# Patient Record
Sex: FEMALE | URBAN - METROPOLITAN AREA
[De-identification: names, ages, dates, MRNs, and addresses within clinical notes are randomized per-mention and may not be internally consistent; named-entity substitution may affect disease eponyms.]

---

## 2019-01-17 ENCOUNTER — OFFICE VISIT (OUTPATIENT)
Dept: INTERNAL MEDICINE CLINIC | Facility: CLINIC | Age: 24
End: 2019-01-17
Payer: COMMERCIAL

## 2019-01-17 VITALS
WEIGHT: 293 LBS | HEIGHT: 72 IN | TEMPERATURE: 97.8 F | HEART RATE: 98 BPM | DIASTOLIC BLOOD PRESSURE: 80 MMHG | BODY MASS INDEX: 39.68 KG/M2 | SYSTOLIC BLOOD PRESSURE: 110 MMHG | OXYGEN SATURATION: 98 %

## 2019-01-17 DIAGNOSIS — J06.9 VIRAL UPPER RESPIRATORY TRACT INFECTION: Primary | ICD-10-CM

## 2019-01-17 DIAGNOSIS — H61.23 BILATERAL IMPACTED CERUMEN: ICD-10-CM

## 2019-01-17 PROCEDURE — 99202 OFFICE O/P NEW SF 15 MIN: CPT | Performed by: NURSE PRACTITIONER

## 2019-01-17 PROCEDURE — 1036F TOBACCO NON-USER: CPT | Performed by: NURSE PRACTITIONER

## 2019-01-17 PROCEDURE — 3008F BODY MASS INDEX DOCD: CPT | Performed by: NURSE PRACTITIONER

## 2019-01-17 RX ORDER — BENZONATATE 100 MG/1
100 CAPSULE ORAL 3 TIMES DAILY PRN
Qty: 20 CAPSULE | Refills: 0 | Status: SHIPPED | OUTPATIENT
Start: 2019-01-17

## 2019-01-17 NOTE — PROGRESS NOTES
Assessment/Plan:     Diagnoses and all orders for this visit:    Viral upper respiratory tract infection  -     benzonatate (TESSALON PERLES) 100 mg capsule; Take 1 capsule (100 mg total) by mouth 3 (three) times a day as needed for cough  -     Symptomatic treatment with mucinex, tessalon perles, warm tea with honey, warm salt water gargles  Continue nasocort and zyrtec  Advised to follow up if not improving in 5-7 days or for fever >100 4  Bilateral impacted cerumen  -     carbamide peroxide (DEBROX) 6 5 % otic solution; Administer 5 drops into both ears daily at bedtime for 5 days  -     Schedule follow up for ear lavage    Other orders  -     Cetirizine HCl (ZYRTEC ALLERGY PO); Take by mouth 2 (two) times a day  -     Triamcinolone Acetonide (NASACORT ALLERGY 24HR NA); into each nostril      Pt needs to schedule appt to establish care     Subjective:     Patient ID: Manda Valdes is a 26 y o  female  HPI    Patient is here today as a new pt to our practice for an acute visit feeling ill  She is complaining of sore throat and rhinorrhea  Onset of symptoms was 4-5 days ago  Symptoms are unchanged  Symptoms include sore throat, nasal congestion, rhinorrhea, post nasal drip, cough occasionally productive  She has tried daytime cold medicine, cetirizine, cough drops  She has not been using her nasocort because she gets nervous mixing medications  Patient has hx of T&A due to frequent strep  Pt has a hx of mono  No recent exposure to strep    She was not previously following with a PCP  She does report hx of anxiety and depression  The following portions of the patient's history were reviewed and updated as appropriate: allergies, current medications, past family history, past medical history, past social history, past surgical history and problem list     Review of Systems   Constitutional: Positive for activity change (run down), appetite change (diminished), chills, diaphoresis and fatigue   Negative for fever    HENT: Positive for congestion, ear pain (pressure), rhinorrhea and sore throat  Negative for ear discharge, postnasal drip, sinus pain and sinus pressure  Respiratory: Positive for cough and shortness of breath  Negative for chest tightness and wheezing  Gastrointestinal: Positive for nausea  Negative for diarrhea and vomiting  Neurological: Positive for headaches  Past Medical History:   Diagnosis Date    Allergic          Current Outpatient Prescriptions:     Cetirizine HCl (ZYRTEC ALLERGY PO), Take by mouth 2 (two) times a day, Disp: , Rfl:     Triamcinolone Acetonide (NASACORT ALLERGY 24HR NA), into each nostril, Disp: , Rfl:     Allergies   Allergen Reactions    Amoxil [Amoxicillin] Hives       Social History   Past Surgical History:   Procedure Laterality Date    TONSILLECTOMY       Family History   Problem Relation Age of Onset    No Known Problems Mother     Gout Father        Objective:  /80 (BP Location: Left arm, Patient Position: Sitting, Cuff Size: Large)   Pulse 98   Temp 97 8 °F (36 6 °C) (Oral)   Ht 6' 5" (1 956 m)   Wt (!) 137 kg (302 lb 9 6 oz)   SpO2 98%   BMI 35 88 kg/m²      Physical Exam   Constitutional: She is oriented to person, place, and time  She appears well-developed and well-nourished  No distress  obese   HENT:   Head: Normocephalic and atraumatic  Right Ear: Tympanic membrane and external ear normal    Left Ear: Tympanic membrane and external ear normal    Nose: Rhinorrhea present  No mucosal edema  Right sinus exhibits no maxillary sinus tenderness and no frontal sinus tenderness  Left sinus exhibits no maxillary sinus tenderness and no frontal sinus tenderness  Mouth/Throat: Oropharynx is clear and moist and mucous membranes are normal  No oropharyngeal exudate, posterior oropharyngeal edema or posterior oropharyngeal erythema  Pale nasal mucosa    Eyes: Pupils are equal, round, and reactive to light   Conjunctivae and EOM are normal    Neck: Normal range of motion  Neck supple  Cardiovascular: Normal rate, regular rhythm and normal heart sounds  No murmur heard  Pulmonary/Chest: Effort normal and breath sounds normal  No respiratory distress  She has no decreased breath sounds  She has no wheezes  She has no rhonchi  Musculoskeletal: She exhibits no edema  Lymphadenopathy:     She has no cervical adenopathy  Neurological: She is alert and oriented to person, place, and time  Skin: Skin is warm and dry  She is not diaphoretic  Psychiatric: She has a normal mood and affect  Her behavior is normal    Vitals reviewed

## 2019-01-17 NOTE — PATIENT INSTRUCTIONS
Start mucinex around the clock for 3-5 days  Start nasocort daily  Continue zyrtec  Start tessalon perles every 8 hours as needed for cough  Warm tea with honey and warm salt water gargles for sore throat  Follow up if symptoms are not improving in 5-7 days or if you develop a fever > 100 4     Use debrox drops in each ear, 5 drops per ear before bed for 5 days, then come in to have ears flushed